# Patient Record
(demographics unavailable — no encounter records)

---

## 2025-07-28 NOTE — RISK ASSESSMENT
[Clinical Interview] : Clinical Interview [Yes] : 1. Passive Ideation: Have you wished you were dead or wished you could go to sleep and not wake up? Yes [In last 30 days] : in the last 30 days [No] : No [(1) Fleeting - few seconds/minutes] : Fleeting - a few seconds or minutes [(0) Does not apply] : Does not apply [No known suicide factors] : No known suicide factors [Identifies reasons for living] : identifies reasons for living [Supportive social network of family or friends] : supportive social network of family or friends [Ability to cope with stress] : ability to cope with stress [Engaged in work or school] : engaged in work or school [None in the patient's lifetime] : None in the patient's lifetime [None Known] : none known [No known risk factors] : No known risk factors

## 2025-07-28 NOTE — HISTORY OF PRESENT ILLNESS
[FreeTextEntry1] : Pt is a 10 y/o going into 5th grade, domiciled with mother w/ no PPH  no past inpt admissions, no past suicide attempts or SIB, PMH of constipation and enuresis, no substance use and no hx of abuse, BIB mother for evaluation of "behavioral issues and verbalizing thoughts of wanting to die"  Interviewed mother first: Reports patient has been having "temper tantrums" every time he doesn't get this way. Reports crying, yelling, screaming whenever his ipad or console is taken away from him, every other week, and this has worsened over the last 1 yr. Reports 2 instances- one last week and one a few months ago where during that tantrum mentioned "Nobody cares about me", "I don't want to live anymore". Mother reports this lasted for about a few minutes to an hour, after which he calmed himself down. Reports he did not act on those thoughts, nor he had any thoughts of methods or plan. Reports being increasingly defiant in school and uncooperative with his teachers this school year, but denies any serious issues at school. Denies any violence, getting into any fights, has good friends and has achieved good grades this school year. Reports patient has honors in Math, is part of gymnastics, band team, and his art work has been displayed in public library. Reports patient is generally a "loving and happy kid", except for these brief moments and usually when she is trying to discipline him.   Reports his sleep and appetite is good. Reports patient having enuresis issue since last 2 years being managed by the pediatrician. Reports he had full bladder control around 4 or 5 yr old and started bedwetting since last 2 years. No acute stressors reports. Reports patient also has constipation, with is probably a cause for his enuresis which is currently being addressed by the pediatrician. Otherwise, reports patient met all milestones on time. No issues during pregnancy or child birth. Was a FTND.   Interview with patient: Pt presented calm and cooperative w/ appropriate affect. Reports he is enjoying this summer and has enjoyed school year. Reoprts he likes to hang out with friends, likes Math and gymnastics. Reports is mood is "Happy and joyful", Reports enjoying being at both Moms house and dads house. Denies any fights, bullying or inappropriate touching at school. Patient endorses getting "angry and upset" easily when "mom takes a lot of stuff away from me". Reports he gets angry and sad at the same time and lasts for 30min - 1hr and is able to calm himself down after that. Reports he had 2 instances when he had thoughts of "not living anymore" during the time of sadness, but denies any intent or plan to act on those thoughts. Denies any SIB. Denies he could ever act on those thoughts- "I would be scared, mom would be sad". Patient is future oriented- wants to be "successful, a sports player in soccer or football and is looking forward to celebrating his b'day this summer".  [FreeTextEntry2] : None [FreeTextEntry3] : None

## 2025-07-28 NOTE — SOCIAL HISTORY
[No Known Substance Use] : no known substance use [FreeTextEntry1] : Lives with mother. Parents  5 yrs ago. Father visits 3 times a week and patient stays over with father every other weekend.  no

## 2025-07-28 NOTE — REASON FOR VISIT
[Behavioral Health Urgent Care Assessment] : a behavioral health urgent care assessment [PMD] : pmd [Patient] : patient [Mother] : mother [Self] : alone [TextBox_17] : Answered positive in ASQ screening questionnare

## 2025-07-28 NOTE — PHYSICAL EXAM
[Normal] : normal [None] : none [Well groomed] : well groomed [Cooperative] : cooperative [Euthymic] : euthymic [Full] : full [Clear] : clear [Linear/Goal Directed] : linear/goal directed [Average] : average [WNL] : within normal limits [Positive interaction] : positive interaction [Unremarkable/age appropriate] : unremarkable/age appropriate [FreeTextEntry1] : casually and neatly dressed; lois [FreeTextEntry7] : two occasions of passive SI in context of limit setting

## 2025-07-28 NOTE — PLAN
[Provision of National Suicide Prevention Lifeline 7-674-339-TALK (3436)] : Provision of national suicide prevention lifeline 7-636-050-talk (1139) [Patient] : patient [Family] : family [Education provided regarding environmental safety/ lethal means restriction] : Education provided regarding environmental safety/ lethal means restriction [Contact was Attempted] : contact was attempted [TextBox_9] : Linkage to individual and parent therapy  [TextBox_11] : not indicated [TextBox_13] : Patient does not exhibit imminent safety risk, so can be followed as an out-patient for further rapport building, information gathering, and treatment. Safety plan completed by patient, scanned into chart, and the original copy was provided to patient. [TextBox_26] : left message with Dr. Pickett, PCP

## 2025-07-28 NOTE — DISCUSSION/SUMMARY
[Low acute suicide risk] : Low acute suicide risk [No] : No [Yes] : Safety Plan completed/updated (for individuals at risk): Yes [FreeTextEntry1] : Despite Risk factors of SI, male gender, patient endorses several Protective factors including no history of suicide attempts, no history of self-injurious behavior, no substance use, has reasons for living, interested in therapy, no history of panic attacks, no history of impulsivity, strong family/social support, domiciled, no patt, no psychosis, no psychiatric hospitalization, current willingness to engage in treatment, participation in safety planning, future orientation, hopeful, help-seeking, engaged in school & activities, current denial of any suicidal intent/plan or urges to self-harm, no aggression/violence, no access to guns, family is able to means restrict, no legal history